# Patient Record
Sex: FEMALE | Race: WHITE | Employment: STUDENT | URBAN - METROPOLITAN AREA
[De-identification: names, ages, dates, MRNs, and addresses within clinical notes are randomized per-mention and may not be internally consistent; named-entity substitution may affect disease eponyms.]

---
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
## 2023-03-21 ENCOUNTER — OFFICE VISIT (OUTPATIENT)
Dept: FAMILY MEDICINE CLINIC | Age: 23
End: 2023-03-21

## 2023-03-21 VITALS
OXYGEN SATURATION: 98 % | HEIGHT: 70 IN | DIASTOLIC BLOOD PRESSURE: 82 MMHG | HEART RATE: 71 BPM | WEIGHT: 146.6 LBS | BODY MASS INDEX: 20.99 KG/M2 | RESPIRATION RATE: 15 BRPM | SYSTOLIC BLOOD PRESSURE: 116 MMHG

## 2023-03-21 DIAGNOSIS — S06.0X1D CONCUSSION WITH LOSS OF CONSCIOUSNESS OF 30 MINUTES OR LESS, SUBSEQUENT ENCOUNTER: ICD-10-CM

## 2023-03-21 DIAGNOSIS — S09.90XD CLOSED HEAD INJURY, SUBSEQUENT ENCOUNTER: Primary | ICD-10-CM
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
## 2023-03-21 PROBLEM — N92.6 IRREGULAR PERIODS: Status: ACTIVE | Noted: 2021-03-31

## 2023-03-21 RX ORDER — CLINDAMYCIN PHOSPHATE 10 UG/ML
LOTION TOPICAL
COMMUNITY
Start: 2021-07-28

## 2023-03-21 RX ORDER — ADAPALENE 3 MG/G
GEL TOPICAL
COMMUNITY
Start: 2021-07-28

## 2023-03-21 RX ORDER — NORETHINDRONE ACETATE AND ETHINYL ESTRADIOL 1MG-20(21)
KIT ORAL
COMMUNITY
Start: 2023-03-20

## 2023-03-21 RX ORDER — DAPSONE 75 MG/G
GEL TOPICAL
COMMUNITY
Start: 2021-07-28

## 2023-03-21 RX ORDER — IBUPROFEN 600 MG/1
600 TABLET ORAL EVERY 6 HOURS PRN
COMMUNITY

## 2023-03-21 RX ORDER — ESCITALOPRAM OXALATE 10 MG/1
10 TABLET ORAL DAILY
COMMUNITY
Start: 2023-02-16

## 2023-03-21 RX ORDER — DEXTROAMPHETAMINE SACCHARATE, AMPHETAMINE ASPARTATE MONOHYDRATE, DEXTROAMPHETAMINE SULFATE AND AMPHETAMINE SULFATE 6.25; 6.25; 6.25; 6.25 MG/1; MG/1; MG/1; MG/1
CAPSULE, EXTENDED RELEASE ORAL
COMMUNITY
Start: 2023-02-19

## 2023-03-21 SDOH — ECONOMIC STABILITY: HOUSING INSECURITY
IN THE LAST 12 MONTHS, WAS THERE A TIME WHEN YOU DID NOT HAVE A STEADY PLACE TO SLEEP OR SLEPT IN A SHELTER (INCLUDING NOW)?: NO

## 2023-03-21 SDOH — ECONOMIC STABILITY: FOOD INSECURITY: WITHIN THE PAST 12 MONTHS, YOU WORRIED THAT YOUR FOOD WOULD RUN OUT BEFORE YOU GOT MONEY TO BUY MORE.: NEVER TRUE

## 2023-03-21 SDOH — ECONOMIC STABILITY: INCOME INSECURITY: HOW HARD IS IT FOR YOU TO PAY FOR THE VERY BASICS LIKE FOOD, HOUSING, MEDICAL CARE, AND HEATING?: NOT HARD AT ALL

## 2023-03-21 SDOH — ECONOMIC STABILITY: FOOD INSECURITY: WITHIN THE PAST 12 MONTHS, THE FOOD YOU BOUGHT JUST DIDN'T LAST AND YOU DIDN'T HAVE MONEY TO GET MORE.: NEVER TRUE

## 2023-03-21 ASSESSMENT — PATIENT HEALTH QUESTIONNAIRE - PHQ9
SUM OF ALL RESPONSES TO PHQ QUESTIONS 1-9: 1
1. LITTLE INTEREST OR PLEASURE IN DOING THINGS: 0
SUM OF ALL RESPONSES TO PHQ QUESTIONS 1-9: 1
2. FEELING DOWN, DEPRESSED OR HOPELESS: 1
SUM OF ALL RESPONSES TO PHQ9 QUESTIONS 1 & 2: 1

## 2023-03-21 NOTE — PROGRESS NOTES
[address]  [address] [place] [address]  [address] [address]  Dept: [phone]  Dept Fax: [phone]    Rosalie Christensen is a 23 y.o. female who presents today for her medical conditions/complaints of   Chief Complaint   Patient presents with    Headache     Patient fell on 3/18/23           HPI:     /82   Pulse 71   Resp 15   Ht 5' 9.5\" (1.765 m)   Wt 146 lb 9.6 oz (66.5 kg)   SpO2 98%   BMI 21.34 kg/m²       HPI  Pt presented to the clinic today with mom with c/o headache. Pt was on Spring Break in [place]. She fell and hit her head, scrapped her face and loosed the 2 top teeth. She did have LOC- unsure for how long. Pt does not remember much of the events the led up to the incident due to being intoxicated. She was seen in the ER in [place] was treated and released. She then began having nausea and headache and returned to another ER in [place] for evaluation. She had blood work, CT head and facial bones which were negative. She was given IVF's, Zofran and Motrin and observed. Was discharged home. Diagnosed with closed head injury/concussion. She is having intermittent headaches which are provoked from reading, using a computer, bright lights. She is having a hard time focusing to complete her school work due to the headache. She denies fever, neck pain, numbness, blurry vision. Was seen by her dentist today. She states they pushed the top teeth back in. Has appt with dentist next week for re evaluation of her teeth. Requesting school note due to symptoms. History reviewed. No pertinent past medical history. History reviewed. No pertinent surgical history. History reviewed. No pertinent family history. Social History     Tobacco Use    Smoking status: Never    Smokeless tobacco: Never   Substance Use Topics    Alcohol use: No        Prior to Visit Medications    Medication Sig Taking?

## 2023-03-21 NOTE — LETTER
March 21, 2023       Jeniffer Fabian YOB: 2000   [address] Date of Visit:  3/21/2023       To Whom It May Concern:    Yisel Farr was seen in my clinic on 3/21/2023. She may return to school on 3/28/2023. Due to the patient's medical condition please excuse from school and school related work. .    If you have any questions or concerns, please don't hesitate to call.     Sincerely,        GABY Singletary - CNP

## 2023-03-22 ASSESSMENT — ENCOUNTER SYMPTOMS
COLOR CHANGE: 1
RHINORRHEA: 0
SHORTNESS OF BREATH: 0
COUGH: 0
SORE THROAT: 0
PHOTOPHOBIA: 0
EYE PAIN: 0
CHEST TIGHTNESS: 0
FACIAL SWELLING: 1
VOMITING: 0
NAUSEA: 0